# Patient Record
Sex: FEMALE | Race: WHITE | Employment: UNEMPLOYED | URBAN - METROPOLITAN AREA
[De-identification: names, ages, dates, MRNs, and addresses within clinical notes are randomized per-mention and may not be internally consistent; named-entity substitution may affect disease eponyms.]

---

## 2019-09-25 ENCOUNTER — OFFICE VISIT (OUTPATIENT)
Dept: SURGERY | Age: 54
End: 2019-09-25

## 2019-09-25 ENCOUNTER — DOCUMENTATION ONLY (OUTPATIENT)
Dept: SURGERY | Age: 54
End: 2019-09-25

## 2019-09-25 VITALS
BODY MASS INDEX: 32.44 KG/M2 | HEIGHT: 64 IN | WEIGHT: 190 LBS | DIASTOLIC BLOOD PRESSURE: 73 MMHG | HEART RATE: 65 BPM | SYSTOLIC BLOOD PRESSURE: 145 MMHG

## 2019-09-25 DIAGNOSIS — Z91.89 AT HIGH RISK FOR BREAST CANCER: Primary | ICD-10-CM

## 2019-09-25 RX ORDER — ESCITALOPRAM OXALATE 10 MG/1
TABLET ORAL
Refills: 6 | COMMUNITY
Start: 2019-09-17

## 2019-09-25 RX ORDER — DEXAMETHASONE 4 MG/1
TABLET ORAL
Refills: 2 | COMMUNITY
Start: 2019-07-03

## 2019-09-25 RX ORDER — AMLODIPINE BESYLATE 10 MG/1
TABLET ORAL
Refills: 6 | COMMUNITY
Start: 2019-09-13

## 2019-09-25 NOTE — LETTER
2019 8:58 AM 
 
Patient:  Jazmin Gao YOB: 1965 Date of Visit: 2019 Dear Dr. Carlos Berrios: Thank you for referring Ms. Jazmin Gao to me for evaluation/treatment. Below are the relevant portions of my assessment and plan of care. HISTORY OF PRESENT ILLNESS Jazmin Gao is a 62 y.o. female. HPI 
NEW patient consult referred by Dr. Erick Currie for abnormal mammogram and ultrasound of RIGHT breast. She does not feel any palpable masses but states they told her she has very dense breasts and needs an MRI but was told her insurance denied this and she was referred to DR PANKAJ MACIAS [facility]. Denies any nipple discharge/inversion or skin changes of the breast. 
  
Family History: Mother, breast cancer, age 48,  from other causes 
  
Imaging at [address] [place] [address], 19, BILAT scn, BIRADS 0 Mammogram, 19, BILAT dx and RIGHT ultrasound, BIRADS 0, with recommendation for BILATERAL MRI be done. 
  
 
Past Medical History:  
Diagnosis Date  Chronic obstructive pulmonary disease ([facility] [address]  Congestive heart failure ([facility] [address]  Depression  Hypertension  Sleep apnea   
 cpap nightly Past Surgical History:  
Procedure Laterality Date  BREAST SURGERY PROCEDURE UNLISTED Left   
 needle bx once, surgical exc bx X2-all benign  HX CHOLECYSTECTOMY  HX HEENT    
 partial thyroidectomy Social History Socioeconomic History  Marital status: UNKNOWN Spouse name: Not on file  Number of children: Not on file  Years of education: Not on file  Highest education level: Not on file Occupational History  Not on file Social Needs  Financial resource strain: Not on file  Food insecurity:  
  Worry: Not on file Inability: Not on file  Transportation needs:  
  Medical: Not on file Non-medical: Not on file Tobacco Use  Smoking status: Current Every Day Smoker Packs/day: 0.75 Years: 30.00 Pack years: 22.50 Types: Cigarettes  Smokeless tobacco: Never Used Substance and Sexual Activity  Alcohol use: Yes Comment: 4 shots of liquor/day, occasionally more  Drug use: Not on file  Sexual activity: Not on file Lifestyle  Physical activity:  
  Days per week: Not on file Minutes per session: Not on file  Stress: Not on file Relationships  Social connections:  
  Talks on phone: Not on file Gets together: Not on file Attends Scientologist service: Not on file Active member of club or organization: Not on file Attends meetings of clubs or organizations: Not on file Relationship status: Not on file  Intimate partner violence:  
  Fear of current or ex partner: Not on file Emotionally abused: Not on file Physically abused: Not on file Forced sexual activity: Not on file Other Topics Concern  Not on file Social History Narrative  Not on file Current Outpatient Medications on File Prior to Visit Medication Sig Dispense Refill  escitalopram oxalate (LEXAPRO) 10 mg tablet   6  
 amLODIPine (NORVASC) 10 mg tablet   6  
 FLOVENT  mcg/actuation inhaler   2 No current facility-administered medications on file prior to visit. No Known Allergies OB History None Obstetric Comments Menarche 13, LMP age 46, # of children 3, age of 4st delivery 23, Hysterectomy/oophorectomy no/no, Breast bx yes, LEFT x3 all benign, history of breast feeding yes, BCP no, Hormone therapy no ROS HENT: Positive for ear pain. Eyes: Positive for blurred vision. Respiratory: Positive for cough and shortness of breath. Cardiovascular: Positive for leg swelling. Gastrointestinal: Positive for diarrhea. Musculoskeletal: Positive for falls and joint pain. Endo/Heme/Allergies: Bruises/bleeds easily. Psychiatric/Behavioral: Positive for depression and memory loss. The patient is nervous/anxious and has insomnia. All other systems reviewed and are negative. Physical Exam  
Cardiovascular: Normal rate, regular rhythm and normal heart sounds. Pulmonary/Chest: Breath sounds normal. Right breast exhibits no inverted nipple, no mass, no nipple discharge, no skin change and no tenderness. Left breast exhibits no inverted nipple, no mass, no nipple discharge, no skin change and no tenderness. Breasts are symmetrical.  
 
 
Lymphadenopathy:  
  She has no cervical adenopathy. Right cervical: No superficial cervical, no deep cervical and no posterior cervical adenopathy present. Left cervical: No superficial cervical, no deep cervical and no posterior cervical adenopathy present. She has no axillary adenopathy. Right axillary: No pectoral and no lateral adenopathy present. Left axillary: No pectoral and no lateral adenopathy present. ASSESSMENT and PLAN 
  ICD-10-CM ICD-9-CM 1. At high risk for breast cancer Z91.89 V49.89 MRI BREAST BI W WO CONT New patient presents for evaluation of abnormal RIGHT breast imaging, and is doing well overall. Scarring at LEFT breast UOQ s/p previous biopsies, physical exam today otherwise normal. 
 
Using the Tyrer-Cuzick model, calculated pt's lifetime risk at 29% for breast cancer. This qualifies pt for breast MRI for further evaluation. Discussed that if denied by insurance, will have [id number] discussion to try to get it approved. Lifetime risk also qualifies pt for enrollment in our high risk clinic that includes annual screening breast imaging and follow-up appointments with our nurse practitioner. Radiology will call to schedule MRI, and I will f/u with the results. F/U in 1 year with Shyam Sosa NP. This plan was reviewed with the patient and patient agrees. All questions were answered.  
 
Written by Odalis Pierce, as dictated by Dr. Joyce Sandhoff, MD. 
 
 
 If you have questions, please do not hesitate to call me. I look forward to following Ms. Shereen Canavan along with you.  
 
 
 
Sincerely, 
 
 
Liliam Gloria MD

## 2019-09-25 NOTE — PROGRESS NOTES
HISTORY OF PRESENT ILLNESS  Sabrina Combs is a 62 y.o. female. HPI  NEW patient consult referred by Dr. Nori Blackwell for abnormal mammogram and ultrasound of RIGHT breast. She does not feel any palpable masses but states they told her she has very dense breasts and needs an MRI but was told her insurance denied this and she was referred to DR PANKAJ MACIAS [facility]. Denies any nipple discharge/inversion or skin changes of the breast.     Family History:   Mother, breast cancer, age 48,  from other causes     Imaging at [address], 19, BILAT scn, BIRADS 0  Mammogram, 19, BILAT dx and RIGHT ultrasound, BIRADS 0, with recommendation for BILATERAL MRI be done.       Past Medical History:   Diagnosis Date    Chronic obstructive pulmonary disease ([address]     Congestive heart failure ([facility] [address]     Depression     Hypertension     Sleep apnea     cpap nightly       Past Surgical History:   Procedure Laterality Date    BREAST SURGERY PROCEDURE UNLISTED Left     needle bx once, surgical exc bx X2-all benign    HX CHOLECYSTECTOMY      HX HEENT      partial thyroidectomy       Social History     Socioeconomic History    Marital status: UNKNOWN     Spouse name: Not on file    Number of children: Not on file    Years of education: Not on file    Highest education level: Not on file   Occupational History    Not on file   Social Needs    Financial resource strain: Not on file    Food insecurity:     Worry: Not on file     Inability: Not on file    Transportation needs:     Medical: Not on file     Non-medical: Not on file   Tobacco Use    Smoking status: Current Every Day Smoker     Packs/day: 0.75     Years: 30.00     Pack years: 22.50     Types: Cigarettes    Smokeless tobacco: Never Used   Substance and Sexual Activity    Alcohol use: Yes     Comment: 4 shots of liquor/day, occasionally more    Drug use: Not on file    Sexual activity: Not on file   Lifestyle    Physical activity:     Days per week: Not on file Minutes per session: Not on file    Stress: Not on file   Relationships    Social connections:     Talks on phone: Not on file     Gets together: Not on file     Attends Church service: Not on file     Active member of club or organization: Not on file     Attends meetings of clubs or organizations: Not on file     Relationship status: Not on file    Intimate partner violence:     Fear of current or ex partner: Not on file     Emotionally abused: Not on file     Physically abused: Not on file     Forced sexual activity: Not on file   Other Topics Concern    Not on file   Social History Narrative    Not on file       Current Outpatient Medications on File Prior to Visit   Medication Sig Dispense Refill    escitalopram oxalate (LEXAPRO) 10 mg tablet   6    amLODIPine (NORVASC) 10 mg tablet   6    FLOVENT  mcg/actuation inhaler   2     No current facility-administered medications on file prior to visit. No Known Allergies    OB History    None      Obstetric Comments   Menarche 13, LMP age 46, # of children 3, age of 4st delivery 23, Hysterectomy/oophorectomy no/no, Breast bx yes, LEFT x3 all benign, history of breast feeding yes, BCP no, Hormone therapy no             ROS  HENT: Positive for ear pain. Eyes: Positive for blurred vision. Respiratory: Positive for cough and shortness of breath. Cardiovascular: Positive for leg swelling. Gastrointestinal: Positive for diarrhea. Musculoskeletal: Positive for falls and joint pain. Endo/Heme/Allergies: Bruises/bleeds easily. Psychiatric/Behavioral: Positive for depression and memory loss. The patient is nervous/anxious and has insomnia. All other systems reviewed and are negative. Physical Exam   Cardiovascular: Normal rate, regular rhythm and normal heart sounds. Pulmonary/Chest: Breath sounds normal. Right breast exhibits no inverted nipple, no mass, no nipple discharge, no skin change and no tenderness.  Left breast exhibits no inverted nipple, no mass, no nipple discharge, no skin change and no tenderness. Breasts are symmetrical.       Lymphadenopathy:     She has no cervical adenopathy. Right cervical: No superficial cervical, no deep cervical and no posterior cervical adenopathy present. Left cervical: No superficial cervical, no deep cervical and no posterior cervical adenopathy present. She has no axillary adenopathy. Right axillary: No pectoral and no lateral adenopathy present. Left axillary: No pectoral and no lateral adenopathy present. ASSESSMENT and PLAN    ICD-10-CM ICD-9-CM    1. At high risk for breast cancer Z91.89 V49.89 MRI BREAST BI W WO CONT      New patient presents for evaluation of abnormal RIGHT breast imaging, and is doing well overall. Scarring at LEFT breast UOQ s/p previous biopsies, physical exam today otherwise normal.    Using the Tyrer-Cuzick model, calculated pt's lifetime risk at 29% for breast cancer. This qualifies pt for breast MRI for further evaluation. Discussed that if denied by insurance, will have [id number] discussion to try to get it approved. Lifetime risk also qualifies pt for enrollment in our high risk clinic that includes annual screening breast imaging and follow-up appointments with our nurse practitioner. Radiology will call to schedule MRI, and I will f/u with the results. F/U in 1 year with Collins Jones NP. This plan was reviewed with the patient and patient agrees. All questions were answered.     Written by Brant Sheffield, as dictated by Dr. Brook Maza MD.

## 2019-09-25 NOTE — PROGRESS NOTES
HISTORY OF PRESENT ILLNESS Lupe Nice is a 62 y.o. female. HPI NEW patient consult referred by Dr. Lucero Hopper for abnormal mammogram and ultrasound of RIGHT breast. She does not feel any palpable masses but states they told her she has very dense breasts and needs an MRI but was told her insurance denied this and she was referred to DR PANKAJ MACIAS [facility]. Denies any nipple discharge/inversion or skin changes of the breast. 
 
 
Family History: Mother, breast cancer, age 48,  from other causes Imaging at [address] [place] [address], 19, BILAT scn, BIRADS 0 Mammogram, 19, BILAT dx and RIGHT ultrasound, BIRADS 0, with recommendation for BILATERAL MRI be done. Review of Systems HENT: Positive for ear pain. Eyes: Positive for blurred vision. Respiratory: Positive for cough and shortness of breath. Cardiovascular: Positive for leg swelling. Gastrointestinal: Positive for diarrhea. Musculoskeletal: Positive for falls and joint pain. Endo/Heme/Allergies: Bruises/bleeds easily. Psychiatric/Behavioral: Positive for depression and memory loss. The patient is nervous/anxious and has insomnia. All other systems reviewed and are negative. Physical Exam 
 
ASSESSMENT and PLAN 
{ASSESSMENT/PLAN:87717}

## 2019-09-25 NOTE — PROGRESS NOTES
Type of Film: [x] CD [] FILMS  Type of Test: [] MRI [x] MAMMO  From: [employer]   Given to: [facility]  LOCATION  To be Downloaded into PACS:  YES    Patient is to be scheduled for a breast MRI. Imaging will discard the disc after images have been loaded.

## 2019-09-26 NOTE — COMMUNICATION BODY
HISTORY OF PRESENT ILLNESS  Ade Andrea is a 62 y.o. female. HPI  NEW patient consult referred by Dr. Kuldeep Woodward for abnormal mammogram and ultrasound of RIGHT breast. She does not feel any palpable masses but states they told her she has very dense breasts and needs an MRI but was told her insurance denied this and she was referred to DR PANKAJ MACIAS [facility]. Denies any nipple discharge/inversion or skin changes of the breast.     Family History:   Mother, breast cancer, age 48,  from other causes     Imaging at [address] [place] [address] Road, 19, BILAT scn, BIRADS 0  Mammogram, 19, BILAT dx and RIGHT ultrasound, BIRADS 0, with recommendation for BILATERAL MRI be done.       Past Medical History:   Diagnosis Date    Chronic obstructive pulmonary disease ([address]     Cong[facility] failure ([facility] [address]     Depression     Hypertension     Sleep apnea     cpap nightly       Past Surgical History:   Procedure Laterality Date    BREAST SURGERY PROCEDURE UNLISTED Left     needle bx once, surgical exc bx X2-all benign    HX CHOLECYSTECTOMY      HX HEENT      partial thyroidectomy       Social History     Socioeconomic History    Marital status: UNKNOWN     Spouse name: Not on file    Number of children: Not on file    Years of education: Not on file    Highest education level: Not on file   Occupational History    Not on file   Social Needs    Financial resource strain: Not on file    Food insecurity:     Worry: Not on file     Inability: Not on file    Transportation needs:     Medical: Not on file     Non-medical: Not on file   Tobacco Use    Smoking status: Current Every Day Smoker     Packs/day: 0.75     Years: 30.00     Pack years: 22.50     Types: Cigarettes    Smokeless tobacco: Never Used   Substance and Sexual Activity    Alcohol use: Yes     Comment: 4 shots of liquor/day, occasionally more    Drug use: Not on file    Sexual activity: Not on file   Lifestyle    Physical activity:     Days per week: Not on file Minutes per session: Not on file    Stress: Not on file   Relationships    Social connections:     Talks on phone: Not on file     Gets together: Not on file     Attends Pentecostal service: Not on file     Active member of club or organization: Not on file     Attends meetings of clubs or organizations: Not on file     Relationship status: Not on file    Intimate partner violence:     Fear of current or ex partner: Not on file     Emotionally abused: Not on file     Physically abused: Not on file     Forced sexual activity: Not on file   Other Topics Concern    Not on file   Social History Narrative    Not on file       Current Outpatient Medications on File Prior to Visit   Medication Sig Dispense Refill    escitalopram oxalate (LEXAPRO) 10 mg tablet   6    amLODIPine (NORVASC) 10 mg tablet   6    FLOVENT  mcg/actuation inhaler   2     No current facility-administered medications on file prior to visit. No Known Allergies    OB History    None      Obstetric Comments   Menarche 13, LMP age 46, # of children 3, age of 4st delivery 23, Hysterectomy/oophorectomy no/no, Breast bx yes, LEFT x3 all benign, history of breast feeding yes, BCP no, Hormone therapy no             ROS  HENT: Positive for ear pain. Eyes: Positive for blurred vision. Respiratory: Positive for cough and shortness of breath. Cardiovascular: Positive for leg swelling. Gastrointestinal: Positive for diarrhea. Musculoskeletal: Positive for falls and joint pain. Endo/Heme/Allergies: Bruises/bleeds easily. Psychiatric/Behavioral: Positive for depression and memory loss. The patient is nervous/anxious and has insomnia. All other systems reviewed and are negative. Physical Exam   Cardiovascular: Normal rate, regular rhythm and normal heart sounds. Pulmonary/Chest: Breath sounds normal. Right breast exhibits no inverted nipple, no mass, no nipple discharge, no skin change and no tenderness.  Left breast exhibits no inverted nipple, no mass, no nipple discharge, no skin change and no tenderness. Breasts are symmetrical.       Lymphadenopathy:     She has no cervical adenopathy. Right cervical: No superficial cervical, no deep cervical and no posterior cervical adenopathy present. Left cervical: No superficial cervical, no deep cervical and no posterior cervical adenopathy present. She has no axillary adenopathy. Right axillary: No pectoral and no lateral adenopathy present. Left axillary: No pectoral and no lateral adenopathy present. ASSESSMENT and PLAN    ICD-10-CM ICD-9-CM    1. At high risk for breast cancer Z91.89 V49.89 MRI BREAST BI W WO CONT      New patient presents for evaluation of abnormal RIGHT breast imaging, and is doing well overall. Scarring at LEFT breast UOQ s/p previous biopsies, physical exam today otherwise normal.    Using the Tyrer-Cuzick model, calculated pt's lifetime risk at 29% for breast cancer. This qualifies pt for breast MRI for further evaluation. Discussed that if denied by insurance, will have [id number] discussion to try to get it approved. Lifetime risk also qualifies pt for enrollment in our high risk clinic that includes annual screening breast imaging and follow-up appointments with our nurse practitioner. Radiology will call to schedule MRI, and I will f/u with the results. F/U in 1 year with Juan Phillips NP. This plan was reviewed with the patient and patient agrees. All questions were answered.     Written by Katarzyna Ferris, as dictated by Dr. Marvin Smith MD.

## 2019-10-14 ENCOUNTER — HOSPITAL ENCOUNTER (OUTPATIENT)
Dept: MRI IMAGING | Age: 54
Discharge: HOME | End: 2019-10-14
Attending: SURGERY
Payer: MEDICAID

## 2019-10-14 VITALS — BODY MASS INDEX: 32.78 KG/M2 | WEIGHT: 188 LBS

## 2019-10-14 DIAGNOSIS — Z91.89 AT HIGH RISK FOR BREAST CANCER: ICD-10-CM

## 2019-10-14 PROCEDURE — 77049 MRI BREAST C-+ W/CAD BI: CPT

## 2019-10-14 PROCEDURE — 77030021566

## 2019-10-15 PROCEDURE — 77030021566

## 2019-10-16 ENCOUNTER — TELEPHONE (OUTPATIENT)
Dept: SURGERY | Age: 54
End: 2019-10-16

## 2021-07-14 ENCOUNTER — TELEPHONE (OUTPATIENT)
Dept: SLEEP MEDICINE | Age: 56
End: 2021-07-14

## 2021-07-14 NOTE — TELEPHONE ENCOUNTER
Phoned the patient to schedule a sleep consult per Dr. Nicole Deluna.   She declined to schedule due to the fact that she needs something in [place], [place].

## 2023-01-22 ENCOUNTER — HOSPITAL ENCOUNTER (EMERGENCY)
Age: 58
Discharge: HOME | End: 2023-01-23
Attending: EMERGENCY MEDICINE
Payer: COMMERCIAL

## 2023-01-22 ENCOUNTER — APPOINTMENT (OUTPATIENT)
Dept: GENERAL RADIOLOGY | Age: 58
End: 2023-01-22
Attending: EMERGENCY MEDICINE
Payer: COMMERCIAL

## 2023-01-22 ENCOUNTER — APPOINTMENT (OUTPATIENT)
Dept: CT IMAGING | Age: 58
End: 2023-01-22
Attending: EMERGENCY MEDICINE
Payer: COMMERCIAL

## 2023-01-22 DIAGNOSIS — T30.0 BURN: ICD-10-CM

## 2023-01-22 DIAGNOSIS — S20.219A CONTUSION OF FRONT WALL OF THORAX, UNSPECIFIED LOCATION, INITIAL ENCOUNTER: Primary | ICD-10-CM

## 2023-01-22 LAB
ALBUMIN SERPL-MCNC: 2.8 G/DL (ref 3.5–5)
ALBUMIN/GLOB SERPL: 0.5 (ref 1.1–2.2)
ALP SERPL-CCNC: 532 U/L (ref 45–117)
ALT SERPL-CCNC: 83 U/L (ref 12–78)
ANION GAP SERPL CALC-SCNC: 11 MMOL/L (ref 5–15)
AST SERPL W P-5'-P-CCNC: 80 U/L (ref 15–37)
BASOPHILS # BLD: 0.1 K/UL (ref 0–0.1)
BASOPHILS NFR BLD: 2 % (ref 0–1)
BILIRUB SERPL-MCNC: 0.7 MG/DL (ref 0.2–1)
BUN SERPL-MCNC: 8 MG/DL (ref 6–20)
BUN/CREAT SERPL: 14 (ref 12–20)
CA-I BLD-MCNC: 9 MG/DL (ref 8.5–10.1)
CHLORIDE SERPL-SCNC: 99 MMOL/L (ref 97–108)
CO2 SERPL-SCNC: 22 MMOL/L (ref 21–32)
CREAT SERPL-MCNC: 0.57 MG/DL (ref 0.55–1.02)
DIFFERENTIAL METHOD BLD: ABNORMAL
EOSINOPHIL # BLD: 0.2 K/UL (ref 0–0.4)
EOSINOPHIL NFR BLD: 4 % (ref 0–7)
ERYTHROCYTE [DISTWIDTH] IN BLOOD BY AUTOMATED COUNT: 13.5 % (ref 11.5–14.5)
GLOBULIN SER CALC-MCNC: 5.2 G/DL (ref 2–4)
GLUCOSE SERPL-MCNC: 174 MG/DL (ref 65–100)
HCT VFR BLD AUTO: 44.5 % (ref 35–47)
HGB BLD-MCNC: 14.4 G/DL (ref 11.5–16)
IMM GRANULOCYTES # BLD AUTO: 0 K/UL (ref 0–0.04)
IMM GRANULOCYTES NFR BLD AUTO: 1 % (ref 0–0.5)
LYMPHOCYTES # BLD: 1.7 K/UL (ref 0.8–3.5)
LYMPHOCYTES NFR BLD: 30 % (ref 12–49)
MCH RBC QN AUTO: 31.9 PG (ref 26–34)
MCHC RBC AUTO-ENTMCNC: 32.4 G/DL (ref 30–36.5)
MCV RBC AUTO: 98.7 FL (ref 80–99)
MONOCYTES # BLD: 0.5 K/UL (ref 0–1)
MONOCYTES NFR BLD: 9 % (ref 5–13)
NEUTS SEG # BLD: 3.1 K/UL (ref 1.8–8)
NEUTS SEG NFR BLD: 54 % (ref 32–75)
NRBC # BLD: 0 K/UL (ref 0–0.01)
NRBC BLD-RTO: 0 PER 100 WBC
PLATELET # BLD AUTO: 232 K/UL (ref 150–400)
PMV BLD AUTO: 9.3 FL (ref 8.9–12.9)
POTASSIUM SERPL-SCNC: 3.4 MMOL/L (ref 3.5–5.1)
PROT SERPL-MCNC: 8 G/DL (ref 6.4–8.2)
RBC # BLD AUTO: 4.51 M/UL (ref 3.8–5.2)
SODIUM SERPL-SCNC: 132 MMOL/L (ref 136–145)
WBC # BLD AUTO: 5.6 K/UL (ref 3.6–11)

## 2023-01-22 PROCEDURE — 85025 COMPLETE CBC W/AUTO DIFF WBC: CPT

## 2023-01-22 PROCEDURE — 99285 EMERGENCY DEPT VISIT HI MDM: CPT

## 2023-01-22 PROCEDURE — 80053 COMPREHEN METABOLIC PANEL: CPT

## 2023-01-22 PROCEDURE — 83690 ASSAY OF LIPASE: CPT

## 2023-01-22 PROCEDURE — 71045 X-RAY EXAM CHEST 1 VIEW: CPT

## 2023-01-22 PROCEDURE — 82077 ASSAY SPEC XCP UR&BREATH IA: CPT

## 2023-01-22 PROCEDURE — 36415 COLL VENOUS BLD VENIPUNCTURE: CPT

## 2023-01-22 PROCEDURE — 86900 BLOOD TYPING SEROLOGIC ABO: CPT

## 2023-01-22 PROCEDURE — 74177 CT ABD & PELVIS W/CONTRAST: CPT

## 2023-01-22 PROCEDURE — 70450 CT HEAD/BRAIN W/O DYE: CPT

## 2023-01-22 PROCEDURE — 72125 CT NECK SPINE W/O DYE: CPT

## 2023-01-22 PROCEDURE — 84484 ASSAY OF TROPONIN QUANT: CPT

## 2023-01-22 PROCEDURE — 93005 ELECTROCARDIOGRAM TRACING: CPT

## 2023-01-22 PROCEDURE — 16000 INITIAL TREATMENT OF BURN(S): CPT

## 2023-01-22 RX ORDER — SILVER SULFADIAZINE 10 G/1000G
CREAM TOPICAL DAILY
Status: DISCONTINUED | OUTPATIENT
Start: 2023-01-23 | End: 2023-01-22

## 2023-01-22 RX ORDER — SILVER SULFADIAZINE 10 G/1000G
CREAM TOPICAL
Status: COMPLETED | OUTPATIENT
Start: 2023-01-22 | End: 2023-01-23

## 2023-01-23 VITALS
SYSTOLIC BLOOD PRESSURE: 120 MMHG | BODY MASS INDEX: 30.56 KG/M2 | DIASTOLIC BLOOD PRESSURE: 60 MMHG | WEIGHT: 179 LBS | OXYGEN SATURATION: 97 % | TEMPERATURE: 98.6 F | HEIGHT: 64 IN | RESPIRATION RATE: 16 BRPM | HEART RATE: 78 BPM

## 2023-01-23 LAB
ABO + RH BLD: NORMAL
AMPHET UR QL SCN: NEGATIVE
APPEARANCE UR: CLEAR
ATRIAL RATE: 83 BPM
BACTERIA URNS QL MICRO: NEGATIVE /HPF
BARBITURATES UR QL SCN: NEGATIVE
BASE DEFICIT BLDV-SCNC: 0.1 MMOL/L
BDY SITE: ABNORMAL
BENZODIAZ UR QL: NEGATIVE
BILIRUB UR QL: NEGATIVE
BLOOD GROUP ANTIBODIES SERPL: NEGATIVE
BODY TEMPERATURE: 97.4
CALCULATED P AXIS, ECG09: 51 DEGREES
CALCULATED R AXIS, ECG10: 52 DEGREES
CALCULATED T AXIS, ECG11: 68 DEGREES
CANNABINOIDS UR QL SCN: NEGATIVE
COCAINE UR QL SCN: NEGATIVE
COLOR UR: ABNORMAL
DIAGNOSIS, 93000: NORMAL
DRUG SCRN COMMENT,DRGCM: NORMAL
ETHANOL SERPL-MCNC: 66 MG/DL
FIO2 ON VENT: 28 %
GAS FLOW.O2 O2 DELIVERY SYS: 2 L/MIN
GLUCOSE UR STRIP.AUTO-MCNC: >1000 MG/DL
HCO3 BLDV-SCNC: 26 MMOL/L (ref 24–25)
HGB UR QL STRIP: ABNORMAL
KETONES UR QL STRIP.AUTO: NEGATIVE MG/DL
LACTATE SERPL-SCNC: 2.4 MMOL/L (ref 0.4–2)
LEUKOCYTE ESTERASE UR QL STRIP.AUTO: NEGATIVE
LIPASE SERPL-CCNC: 135 U/L (ref 73–393)
METHADONE UR QL: NEGATIVE
NITRITE UR QL STRIP.AUTO: NEGATIVE
OPIATES UR QL: NEGATIVE
P-R INTERVAL, ECG05: 188 MS
PCO2 BLDV: 45.3 MMHG (ref 41–51)
PCP UR QL: NEGATIVE
PERFORMED BY, TECHID: ABNORMAL
PH BLDV: 7.37 (ref 7.32–7.42)
PH UR STRIP: 5
PO2 BLDV: 159 MMHG (ref 25–40)
PROT UR STRIP-MCNC: NEGATIVE MG/DL
Q-T INTERVAL, ECG07: 490 MS
QRS DURATION, ECG06: 82 MS
QTC CALCULATION (BEZET), ECG08: 575 MS
RBC #/AREA URNS HPF: ABNORMAL /HPF (ref 0–5)
SAO2% DEVICE SAO2% SENSOR NAME: ABNORMAL
SP GR UR REFRACTOMETRY: <1.005 (ref 1–1.03)
SPECIMEN EXP DATE BLD: NORMAL
SPECIMEN SITE: ABNORMAL
TROPONIN-HIGH SENSITIVITY: <4 NG/L (ref 0–51)
UROBILINOGEN UR QL STRIP.AUTO: 0.1 EU/DL (ref 0.2–1)
VENTRICULAR RATE, ECG03: 83 BPM
WBC URNS QL MICRO: ABNORMAL /HPF (ref 0–4)

## 2023-01-23 PROCEDURE — 81001 URINALYSIS AUTO W/SCOPE: CPT

## 2023-01-23 PROCEDURE — 83605 ASSAY OF LACTIC ACID: CPT

## 2023-01-23 PROCEDURE — 74011000636 HC RX REV CODE- 636: Performed by: EMERGENCY MEDICINE

## 2023-01-23 PROCEDURE — 16000 INITIAL TREATMENT OF BURN(S): CPT

## 2023-01-23 PROCEDURE — 80307 DRUG TEST PRSMV CHEM ANLYZR: CPT

## 2023-01-23 PROCEDURE — 74011000250 HC RX REV CODE- 250: Performed by: EMERGENCY MEDICINE

## 2023-01-23 PROCEDURE — 36415 COLL VENOUS BLD VENIPUNCTURE: CPT

## 2023-01-23 PROCEDURE — 82803 BLOOD GASES ANY COMBINATION: CPT

## 2023-01-23 RX ORDER — NAPROXEN 500 MG/1
500 TABLET ORAL
Qty: 20 TABLET | Refills: 0 | Status: SHIPPED | OUTPATIENT
Start: 2023-01-23

## 2023-01-23 RX ORDER — NAPROXEN 500 MG/1
500 TABLET ORAL
Qty: 20 TABLET | Refills: 0 | Status: SHIPPED | OUTPATIENT
Start: 2023-01-23 | End: 2023-01-23 | Stop reason: SDUPTHER

## 2023-01-23 RX ADMIN — SILVER SULFADIAZINE: 10 CREAM TOPICAL at 00:08

## 2023-01-23 RX ADMIN — IOPAMIDOL 100 ML: 755 INJECTION, SOLUTION INTRAVENOUS at 00:11
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
## 2023-01-23 NOTE — ED NOTES
RN reviewed all DC teaching with pt, pt with no questions regarding AVS. Pt left with all personal belongings.

## 2023-05-23 RX ORDER — NAPROXEN 500 MG/1
500 TABLET ORAL
COMMUNITY
Start: 2023-01-23

## 2023-05-23 RX ORDER — FLUTICASONE PROPIONATE 110 UG/1
AEROSOL, METERED RESPIRATORY (INHALATION)
COMMUNITY
Start: 2019-07-03

## 2023-05-23 RX ORDER — ESCITALOPRAM OXALATE 10 MG/1
TABLET ORAL
COMMUNITY
Start: 2019-09-17

## 2023-05-23 RX ORDER — AMLODIPINE BESYLATE 10 MG/1
TABLET ORAL
COMMUNITY
Start: 2019-09-13

## 2023-12-04 LAB
HBA1C MFR BLD HPLC: 6.1 %
MICROALBUMIN/CREATININE RATIO, EXTERNAL: 11

## 2024-03-06 LAB
HBA1C MFR BLD HPLC: 6.9 %
MICROALBUMIN/CREATININE RATIO, EXTERNAL: <7

## 2024-07-26 LAB
HBA1C MFR BLD HPLC: 6.5 %
MICROALBUMIN/CREATININE RATIO, EXTERNAL: <7

## 2024-08-05 ENCOUNTER — OFFICE VISIT (OUTPATIENT)
Age: 59
End: 2024-08-05
Payer: MEDICAID

## 2024-08-05 VITALS
SYSTOLIC BLOOD PRESSURE: 117 MMHG | BODY MASS INDEX: 32.44 KG/M2 | OXYGEN SATURATION: 95 % | DIASTOLIC BLOOD PRESSURE: 62 MMHG | HEIGHT: 64 IN | HEART RATE: 60 BPM | TEMPERATURE: 97.5 F | WEIGHT: 190 LBS

## 2024-08-05 DIAGNOSIS — Z85.850 HISTORY OF THYROID CANCER: Primary | ICD-10-CM

## 2024-08-05 DIAGNOSIS — E11.65 TYPE 2 DIABETES MELLITUS WITH HYPERGLYCEMIA, WITHOUT LONG-TERM CURRENT USE OF INSULIN (HCC): ICD-10-CM

## 2024-08-05 PROCEDURE — 99204 OFFICE O/P NEW MOD 45 MIN: CPT | Performed by: INTERNAL MEDICINE

## 2024-08-05 RX ORDER — SEMAGLUTIDE 0.68 MG/ML
INJECTION, SOLUTION SUBCUTANEOUS
Qty: 9 ML | Refills: 3 | Status: SHIPPED | OUTPATIENT
Start: 2024-08-05

## 2024-08-05 RX ORDER — BLOOD-GLUCOSE METER
EACH MISCELLANEOUS
COMMUNITY

## 2024-08-05 RX ORDER — FUROSEMIDE 20 MG/1
20 TABLET ORAL DAILY
COMMUNITY
Start: 2024-04-16 | End: 2025-04-16

## 2024-08-05 RX ORDER — ALBUTEROL SULFATE 90 UG/1
2 AEROSOL, METERED RESPIRATORY (INHALATION) EVERY 4 HOURS PRN
COMMUNITY
Start: 2024-07-26

## 2024-08-05 RX ORDER — CITALOPRAM 20 MG/1
20 TABLET ORAL DAILY
COMMUNITY
Start: 2024-07-26

## 2024-08-05 RX ORDER — ATORVASTATIN CALCIUM 40 MG/1
40 TABLET, FILM COATED ORAL DAILY
COMMUNITY
Start: 2024-04-16 | End: 2025-04-16

## 2024-08-05 RX ORDER — FLUTICASONE FUROATE AND VILANTEROL TRIFENATATE 100; 25 UG/1; UG/1
1 POWDER RESPIRATORY (INHALATION) DAILY
COMMUNITY
Start: 2024-07-26

## 2024-08-05 RX ORDER — ISOSORBIDE MONONITRATE 30 MG/1
30 TABLET, EXTENDED RELEASE ORAL DAILY
COMMUNITY

## 2024-08-05 RX ORDER — METOPROLOL SUCCINATE 25 MG/1
12.5 TABLET, EXTENDED RELEASE ORAL DAILY
COMMUNITY
Start: 2024-04-16 | End: 2025-04-16

## 2024-08-05 RX ORDER — EMPAGLIFLOZIN 25 MG/1
25 TABLET, FILM COATED ORAL DAILY
COMMUNITY

## 2024-08-05 RX ORDER — BLOOD SUGAR DIAGNOSTIC
STRIP MISCELLANEOUS
COMMUNITY
Start: 2023-01-09

## 2024-08-05 NOTE — PROGRESS NOTES
Laurel Allen is a 59 y.o. female here for   Chief Complaint   Patient presents with    New Patient    Thyroid Nodule       1. Have you been to the ER, urgent care clinic since your last visit?  Hospitalized since your last visit? -N/A    2. Have you seen or consulted any other health care providers outside of the [facility] since your last visit?  Include any pap smears or colon screening.-N/A

## 2024-08-05 NOTE — PROGRESS NOTES
Endocrine Consultation    PCP: Milton Napoles MD    Chief Complaint   Patient presents with    New Patient    Thyroid Nodule     HPI:  Laurel Allen is a 59 y.o. female with  has a past medical history of Atrial fibrillation (HCC), Chronic obstructive pulmonary disease (HCC), Congestive heart failure (HCC), Depression, Hyperlipidemia, Hypertension, Menopause, Pre-diabetes, Sleep apnea, and Thyroid cancer (HCC). who is seen in consultation at the request of Milton Napoles MD for evaluation of thyroid disease and T2DM.     Thyroid   - NM uptake scan 5/9/24 = homogenous uptake left thyroid lobe. Questionable cold nodule over lateral L lobe. Low 4 hour, borderline low 24 hour uptake within thyroid remnant nonspecific in setting of lobectomy.   - per reports hx of thyroid cancer  - we do not have pathology report   - remote hx of thyroid cancer 2003 and surgery by ENT Dr. DEMAR Sanchez at [facility]  - not on LT4 replacement     DM  - following w/hepatology for fatty liver   - was on tradjenta in past   - 3/2024: A1C 6.9%, nl urine microalb, lipids NL, egfr 101   - 12/2023: TSH NL   - meter reviewed:   - current DM meds: Jardiance 25 mg daily     Full ROS completed this visit:  Negative for weight gain, weight loss, fevers, chills, blurry vision, changes in vision, chest pain, palpitations, leg swelling, shortness of breath, wheezing, snoring, abdominal pain, nausea, vomiting, diarrhea, constipation, frequent urination, dysuria, tremors, fainting, shakes, cold intolerance, hot intolerance, depression, anxiety, foot sores, rash.    LABS/STUDIES:     No results found for: \"GOX2CEDT\"    Lab Results   Component Value Date/Time    CREATININE 0.57 01/22/2023 11:28 PM    LABGLOM >60 01/22/2023 11:28 PM       No results found for: \"CHOL\", \"TRIG\", \"HDL\"    No results found for: \"TSH\"    No results found for: \"CPEPLT\", \"CPEPTIDE\"    Current Outpatient Medications   Medication Sig Dispense Refill    JARDIANCE 25 MG

## 2024-08-05 NOTE — PATIENT INSTRUCTIONS
Start Ozempic 0.25 mg weekly for 4 weeks, then increase to 0.5 mg weekly thereafter. Monitor for any stomach issues. The symptoms should be mild and last only a couple weeks. If symptoms are more significant than that, please let me know.

## 2024-10-29 ENCOUNTER — LAB (OUTPATIENT)
Age: 59
End: 2024-10-29

## 2024-10-29 DIAGNOSIS — Z85.850 HISTORY OF THYROID CANCER: ICD-10-CM

## 2024-10-29 DIAGNOSIS — E11.65 TYPE 2 DIABETES MELLITUS WITH HYPERGLYCEMIA, WITHOUT LONG-TERM CURRENT USE OF INSULIN (HCC): ICD-10-CM

## 2024-10-30 LAB
EST. AVERAGE GLUCOSE BLD GHB EST-MCNC: 111 MG/DL
HBA1C MFR BLD: 5.5 % (ref 4–5.6)
TSH SERPL DL<=0.05 MIU/L-ACNC: 0.87 UIU/ML (ref 0.36–3.74)

## 2024-11-07 LAB
THYROGLOBULIN (ICMA): 17.7 NG/ML
THYROGLOBULIN (TG-RIA): NORMAL NG/ML
THYROGLOBULIN AB: <1 IU/ML

## 2024-11-11 ENCOUNTER — OFFICE VISIT (OUTPATIENT)
Age: 59
End: 2024-11-11
Payer: MEDICAID

## 2024-11-11 VITALS
DIASTOLIC BLOOD PRESSURE: 67 MMHG | BODY MASS INDEX: 31.82 KG/M2 | SYSTOLIC BLOOD PRESSURE: 129 MMHG | WEIGHT: 186.4 LBS | HEART RATE: 69 BPM | TEMPERATURE: 98.3 F | OXYGEN SATURATION: 95 % | HEIGHT: 64 IN

## 2024-11-11 DIAGNOSIS — E11.65 TYPE 2 DIABETES MELLITUS WITH HYPERGLYCEMIA, WITHOUT LONG-TERM CURRENT USE OF INSULIN (HCC): ICD-10-CM

## 2024-11-11 DIAGNOSIS — Z85.850 HISTORY OF THYROID CANCER: Primary | ICD-10-CM

## 2024-11-11 PROCEDURE — 99214 OFFICE O/P EST MOD 30 MIN: CPT | Performed by: INTERNAL MEDICINE

## 2024-11-11 PROCEDURE — 3044F HG A1C LEVEL LT 7.0%: CPT | Performed by: INTERNAL MEDICINE

## 2024-11-11 NOTE — PROGRESS NOTES
Laurel Allen is a 59 y.o. female here for   Chief Complaint   Patient presents with    Thyroid Problem       1. Have you been to the ER, urgent care clinic since your last visit?  Hospitalized since your last visit? -no    2. Have you seen or consulted any other health care providers outside of the [facility] since your last visit?  Include any pap smears or colon screening.-no      
   Thyroid cancer (HCC) 2003        Past Surgical History:   Procedure Laterality Date    BREAST BIOPSY      BREAST SURGERY Left     needle bx once, surgical exc bx X2-all benign    CHOLECYSTECTOMY      HEENT      partial thyroidectomy    THYROID SURGERY  2003        Social History     Tobacco Use    Smoking status: Every Day     Current packs/day: 0.75     Types: Cigarettes    Smokeless tobacco: Never   Substance Use Topics    Alcohol use: Yes      Employer:  Not Employed     Family History   Problem Relation Age of Onset    Breast Cancer Mother 53      PHYSICAL EXAM  Blood pressure 129/67, pulse 69, temperature 98.3 °F (36.8 °C), temperature source Temporal, height 1.626 m (5' 4\"), weight 84.6 kg (186 lb 6.4 oz), SpO2 95%.   GENERAL: Well-developed, well-nourished female in no acute distress.  HENT: normocephalic, atraumatic   EYES: EOMI. No lid lag, proptosis, icterus, conjunctival injection, periorbital edema.  THYROID: No thyromegaly, no nodules appreciated  LYMPH: No submandibular, cervical, or supraclavicular lymphadenopathy.  CARDIO: Regular rate, no LE edema  RESP: Breathing comfortably. No use of accessory muscles.  GI: Soft, nontender, nondistended. No rebound/guarding. No palpable mass.  MSK: no joint swelling hands, no joint swelling or pain of the knee/ankle  NEUROLOGIC: No tremor. Speech coherent, no dysarthria.  PSYCHIATRIC: Pleasant, Normal affect, normal judgment.  SKIN: Normal temperature, no ecchymoses, no striae    Assessment/Plan:     1. History of thyroid cancer  2. Type 2 diabetes mellitus with hyperglycemia, without long-term current use of insulin (HCC)    History of thyroid cancer  Hx of thyroid ca in 2003 s/p lobectomy  Need initial path if attainable, requested  US neck 6/2024 = Heterogeneous left thyroid lobe without discrete nodule visualized. Right thyroidectomy.   Thyroid function NL   TG panel noted, CTM.     2. T2DM w/hyperglycemia   7/2024 - cr 0.55, urine microalb nl   A1c 6.5%